# Patient Record
Sex: FEMALE | Employment: UNEMPLOYED | URBAN - METROPOLITAN AREA
[De-identification: names, ages, dates, MRNs, and addresses within clinical notes are randomized per-mention and may not be internally consistent; named-entity substitution may affect disease eponyms.]

---

## 2020-02-14 ENCOUNTER — HOSPITAL ENCOUNTER (OUTPATIENT)
Dept: LAB | Age: 25
Discharge: HOME | End: 2020-02-14
Payer: OTHER GOVERNMENT

## 2020-02-14 PROCEDURE — 88175 CYTOPATH C/V AUTO FLUID REDO: CPT

## 2020-03-20 ENCOUNTER — OFFICE VISIT (OUTPATIENT)
Dept: CARDIOLOGY CLINIC | Age: 25
End: 2020-03-20

## 2020-03-20 VITALS
SYSTOLIC BLOOD PRESSURE: 131 MMHG | WEIGHT: 130 LBS | DIASTOLIC BLOOD PRESSURE: 73 MMHG | HEART RATE: 92 BPM | OXYGEN SATURATION: 99 %

## 2020-03-20 DIAGNOSIS — R01.1 MURMUR, HEART: Primary | ICD-10-CM

## 2020-03-20 NOTE — PROGRESS NOTES
1. Have you been to the ER, urgent care clinic since your last visit? Hospitalized since your last visit? No    2. Have you seen or consulted any other health care providers outside of the [address] [place] [address] since your last visit? Include any pap smears or colon screening.  No

## 2020-03-20 NOTE — PROGRESS NOTES
[facility]    Ms. Angel Luis Antonio is 66-year-old female who is here today for cardiac evaluation. She was told that she has a murmur and a. She denies any prior history microinfarction, congestive heart failure or any congenital heart problem. She was told in the past that she had a murmur. She thinks that she may be pregnant. She had a miscarriage in February 2020 and she thinks that she may be pregnant again. She was sent from OB/GYN team.  She does not report any cardiac symptoms. She denies any palpitation, presyncope or syncope. She denies any chest pain or chest tightness or edema she does not report any risk of exertional shortness of breath  Denies any nausea, vomiting, abdominal pain, fever, chills, sputum production. No hematuria or other bleeding complaints    Past Medical History:   Diagnosis Date    Murmur          No past surgical history on file. No current outpatient medications on file. No current facility-administered medications for this visit. Allergies and Sensitivities:  Allergies not on file    Family History:  No family history on file. Social History:  Social History     Tobacco Use    Smoking status: Not on file   Substance Use Topics    Alcohol use: Not on file    Drug use: Not on file     She  has no history on file for tobacco.  She  has no history on file for alcohol. Review of Systems:  Cardiac symptoms as noted above in HPI. All others negative. Denies fatigue, malaise, skin rash, joint pain, blurring vision, photophobia, neck pain, hemoptysis, chronic cough, nausea, vomiting, hematuria, burning micturition, BRBPR, chronic headaches.     Physical Exam:  BP Readings from Last 3 Encounters:   03/20/20 131/73         Pulse Readings from Last 3 Encounters:   03/20/20 92          Wt Readings from Last 3 Encounters:   03/20/20 130 lb (59 kg)       Constitutional: Oriented to person, place, and time.   HENT: Head: Normocephalic and atraumatic. Eyes: Conjunctivae and extraocular motions are normal.   Neck: No JVD present. Carotid bruit is not appreciated. Cardiovascular: Regular rhythm. No gallop or rubs appreciated. 3 out of 6 early systolic murmur most prominent on lateral sternal border  Lung: Breath sounds normal. No respiratory distress. No ronchi or rales appreciated  Abdominal: No tenderness. No rebound and no guarding. Musculoskeletal: There is no lower extremity edema. No cynosis  Lymphadenopathy:  No cervical or supraclavicular adenopathy appriciated. Neurological: No gross motor deficit noted. Skin: No visible skin rash noted. No Ear discharge noted  Psychiatric: Normal mood and affect. Good distal pulse      Review of Data  LABS:   No results found for: NA, K, CL, CO2, GLU, BUN, CREA  No flowsheet data found. No results found for: GPT, ALT  No results found for: HBA1C, HGBE8, IJU7SILO, YCY6EKMB    EKG  Sinus rhythm at 91 bpm.  Poor R wave progression. ECHO    STRESS TEST (EST, PHARM, NUC, ECHO etc)    CATHETERIZATION    IMPRESSION & PLAN:    51-year-old female with history of cardiac murmur. Denies any congenital heart disease history  No symptoms to be concerned of cardiac problem at this time. According to patient she was told that her murmur may be increasing in intensity  We will proceed with echocardiogram at patient's convenience  Currently no evidence of fluid overload or no evidence of heart failure symptoms. Importance of diet and exercise was discussed with patient. This plan was discussed with patient who is in agreement. Thank you for allowing me to participate in patient care. Please feel free to call me if you have any question or concern. Elio Rosales MD  Please note: This document has been produced using voice recognition software. Unrecognized errors in transcription may be present.

## 2020-03-20 NOTE — PATIENT INSTRUCTIONS
Please call central scheduling at [phone]  -Atjb     All testing is completed at [facility], [facility], [facility]     If testing is normal, follow up in 3 months, if abnormal we will call you to come in sooner

## 2020-05-28 ENCOUNTER — HOSPITAL ENCOUNTER (OUTPATIENT)
Dept: NON INVASIVE DIAGNOSTICS | Age: 25
Discharge: HOME | End: 2020-05-28
Attending: INTERNAL MEDICINE
Payer: OTHER GOVERNMENT

## 2020-05-28 VITALS
HEIGHT: 65 IN | SYSTOLIC BLOOD PRESSURE: 131 MMHG | WEIGHT: 130 LBS | BODY MASS INDEX: 21.66 KG/M2 | DIASTOLIC BLOOD PRESSURE: 73 MMHG

## 2020-05-28 DIAGNOSIS — R01.1 MURMUR, HEART: ICD-10-CM

## 2020-05-28 PROCEDURE — 93306 TTE W/DOPPLER COMPLETE: CPT

## 2020-05-29 LAB
ECHO AO ARCH DIAM: 2.3 CM
ECHO AO ASC DIAM: 2.21 CM
ECHO AO ROOT DIAM: 2.14 CM
ECHO IVC SNIFF: 1.89 CM
ECHO LA MAJOR AXIS: 2.81 CM
ECHO LA TO AORTIC ROOT RATIO: 1.31
ECHO LV EDV A2C: 85 ML
ECHO LV EDV A4C: 85.1 ML
ECHO LV EDV BP: 86 ML (ref 56–104)
ECHO LV EDV INDEX A4C: 51.7 ML/M2
ECHO LV EDV INDEX BP: 52.2 ML/M2
ECHO LV EDV NDEX A2C: 51.6 ML/M2
ECHO LV EDV TEICHHOLZ: 0.39 ML
ECHO LV EJECTION FRACTION A2C: 58 %
ECHO LV EJECTION FRACTION A4C: 47 %
ECHO LV EJECTION FRACTION BIPLANE: 53.2 % (ref 55–100)
ECHO LV ESV A2C: 35.5 ML
ECHO LV ESV A4C: 45.1 ML
ECHO LV ESV BP: 40.2 ML (ref 19–49)
ECHO LV ESV INDEX A2C: 21.6 ML/M2
ECHO LV ESV INDEX A4C: 27.4 ML/M2
ECHO LV ESV INDEX BP: 24.4 ML/M2
ECHO LV ESV TEICHHOLZ: 0.18 ML
ECHO LV INTERNAL DIMENSION DIASTOLIC: 3.79 CM (ref 3.9–5.3)
ECHO LV INTERNAL DIMENSION SYSTOLIC: 2.78 CM
ECHO LV IVSD: 0.59 CM (ref 0.6–0.9)
ECHO LV MASS 2D: 67.4 G (ref 67–162)
ECHO LV MASS INDEX 2D: 40.9 G/M2 (ref 43–95)
ECHO LV POSTERIOR WALL DIASTOLIC: 0.71 CM (ref 0.6–0.9)
ECHO LVOT DIAM: 1.66 CM
ECHO LVOT PEAK GRADIENT: 5.5 MMHG
ECHO LVOT PEAK VELOCITY: 117.6 CM/S
ECHO LVOT SV: 45.8 ML
ECHO LVOT VTI: 21.13 CM
ECHO MV A VELOCITY: 43.45 CM/S
ECHO MV E DECELERATION TIME (DT): 206 MS
ECHO MV E VELOCITY: 122.07 CM/S
ECHO MV E/A RATIO: 2.81
ECHO PV VTI: 22.22 CM
ECHO RA MINOR AXIS: 3.44 CM
ECHO TV REGURGITANT MAX VELOCITY: 187.92 CM/S
ECHO TV REGURGITANT PEAK GRADIENT: 14.1 MMHG
LVFS 2D: 26.84 %
LVOT MG: 3.24 MMHG
LVOT MV: 0.85 CM/S
LVSV (MOD BI): 28.27 ML
LVSV (MOD SINGLE 4C): 24.73 ML
LVSV (MOD SINGLE): 30.6 ML
LVSV (TEICH): 20.26 ML
MV DEC SLOPE: 5.93

## 2022-01-11 ENCOUNTER — HOSPITAL ENCOUNTER (OUTPATIENT)
Dept: LAB | Age: 27
Discharge: HOME | End: 2022-01-11
Payer: OTHER GOVERNMENT

## 2022-01-11 PROCEDURE — 88175 CYTOPATH C/V AUTO FLUID REDO: CPT

## 2022-01-25 ENCOUNTER — HOSPITAL ENCOUNTER (OUTPATIENT)
Dept: LAB | Age: 27
Discharge: HOME | End: 2022-01-25
Payer: OTHER GOVERNMENT

## 2022-01-25 PROCEDURE — 88305 TISSUE EXAM BY PATHOLOGIST: CPT

## 2022-02-01 ENCOUNTER — HOSPITAL ENCOUNTER (OUTPATIENT)
Dept: LAB | Age: 27
Discharge: HOME | End: 2022-02-01
Payer: OTHER GOVERNMENT

## 2022-02-01 PROCEDURE — 88305 TISSUE EXAM BY PATHOLOGIST: CPT
